# Patient Record
Sex: MALE | Race: WHITE | NOT HISPANIC OR LATINO | URBAN - NONMETROPOLITAN AREA
[De-identification: names, ages, dates, MRNs, and addresses within clinical notes are randomized per-mention and may not be internally consistent; named-entity substitution may affect disease eponyms.]

---

## 2022-01-20 PROCEDURE — U0004 COV-19 TEST NON-CDC HGH THRU: HCPCS | Performed by: PHYSICIAN ASSISTANT

## 2024-08-05 ENCOUNTER — TELEPHONE (OUTPATIENT)
Dept: INTERNAL MEDICINE | Facility: CLINIC | Age: 34
End: 2024-08-05

## 2024-08-05 ENCOUNTER — TELEMEDICINE (OUTPATIENT)
Dept: INTERNAL MEDICINE | Facility: CLINIC | Age: 34
End: 2024-08-05
Payer: COMMERCIAL

## 2024-08-05 VITALS — WEIGHT: 300 LBS | BODY MASS INDEX: 37.3 KG/M2 | HEIGHT: 75 IN

## 2024-08-05 DIAGNOSIS — E66.09 CLASS 2 OBESITY DUE TO EXCESS CALORIES WITHOUT SERIOUS COMORBIDITY WITH BODY MASS INDEX (BMI) OF 37.0 TO 37.9 IN ADULT: ICD-10-CM

## 2024-08-05 DIAGNOSIS — Z11.59 ENCOUNTER FOR HEPATITIS C SCREENING TEST FOR LOW RISK PATIENT: ICD-10-CM

## 2024-08-05 DIAGNOSIS — R73.03 PREDIABETES: Primary | ICD-10-CM

## 2024-08-05 DIAGNOSIS — R68.89 EPISODES OF DECREASED ATTENTIVENESS: ICD-10-CM

## 2024-08-05 PROBLEM — E66.812 CLASS 2 OBESITY DUE TO EXCESS CALORIES WITHOUT SERIOUS COMORBIDITY WITH BODY MASS INDEX (BMI) OF 37.0 TO 37.9 IN ADULT: Status: ACTIVE | Noted: 2024-08-05

## 2024-08-05 PROCEDURE — 99385 PREV VISIT NEW AGE 18-39: CPT | Performed by: STUDENT IN AN ORGANIZED HEALTH CARE EDUCATION/TRAINING PROGRAM

## 2024-08-05 PROCEDURE — 99213 OFFICE O/P EST LOW 20 MIN: CPT | Performed by: STUDENT IN AN ORGANIZED HEALTH CARE EDUCATION/TRAINING PROGRAM

## 2024-08-05 RX ORDER — PHENTERMINE HYDROCHLORIDE 37.5 MG/1
37.5 TABLET ORAL
COMMUNITY
Start: 2024-07-18

## 2024-08-05 RX ORDER — CHOLECALCIFEROL (VITAMIN D3) 25 MCG
1000 TABLET ORAL DAILY
COMMUNITY

## 2024-08-05 RX ORDER — ASCORBIC ACID 1000 MG
125 TABLET ORAL DAILY
COMMUNITY

## 2024-08-05 RX ORDER — MAGNESIUM GLYCINATE 100 MG
2 CAPSULE ORAL DAILY
COMMUNITY

## 2024-08-05 RX ORDER — LANOLIN ALCOHOL/MO/W.PET/CERES
5000 CREAM (GRAM) TOPICAL DAILY
COMMUNITY

## 2024-08-05 RX ORDER — CHLORAL HYDRATE 500 MG
1000 CAPSULE ORAL
COMMUNITY

## 2024-08-05 RX ORDER — LANOLIN ALCOHOL/MO/W.PET/CERES
400 CREAM (GRAM) TOPICAL DAILY
COMMUNITY

## 2024-08-05 RX ORDER — AMOXICILLIN AND CLAVULANATE POTASSIUM 875; 125 MG/1; MG/1
1 TABLET, FILM COATED ORAL EVERY 12 HOURS SCHEDULED
COMMUNITY
Start: 2024-07-23

## 2024-08-05 NOTE — ASSESSMENT & PLAN NOTE
Patient's (Body mass index is 37.5 kg/m².) indicates that they are morbidly/severely obese (BMI > 40 or > 35 with obesity - related health condition) with health conditions that include none . Weight is unchanged. BMI  is above average; BMI management plan is completed. We discussed portion control and increasing exercise.   Continue to follow with weight loss clinic as pt chooses to continue phentermine

## 2024-08-05 NOTE — TELEPHONE ENCOUNTER
"Relay     \"LVM to call our office back. [facility] or reschedule for another day due to Dr. Wayne being out of office today  \"                  "

## 2024-08-05 NOTE — PROGRESS NOTES
"Chief Complaint  Establish Care (Previous PCP (Never had one))  Difficulty concentrating, annual exam    This was an audio and video enabled telemedicine encounter.  Patient location Home provider location Home office  Subjective      Omid Watts presents to [facility]  History of Present Illness  Vitamin d def: on vitamin D  On B12 daily and fish oil  BP at home usually around 120/70 at home  Obese follows with weight loss clinic, on phentermine, discussed side effects and discussed other options but patient Not interested in GLP 1 injections    He also complains of difficulty in focusing and concentration     Objective   Vital Signs:   Ht 190.5 cm (75\")   Wt 136 kg (300 lb)   BMI 37.50 kg/m²     Body mass index is 37.5 kg/m².            Current Outpatient Medications:     amoxicillin-clavulanate (AUGMENTIN) 875-125 MG per tablet, Take 1 tablet by mouth Every 12 (Twelve) Hours., Disp: , Rfl:     ASHWAGANDHA GUMMIES PO, Take 600 mg by mouth Daily., Disp: , Rfl:     Cholecalciferol 25 MCG (1000 UT) tablet, Take 1 tablet by mouth Daily., Disp: , Rfl:     folic acid (FOLVITE) 400 MCG tablet, Take 1 tablet by mouth Daily., Disp: , Rfl:     Ginkgo Biloba (Ginkoba) 40 MG tablet, Take 125 mg by mouth Daily., Disp: , Rfl:     Green Tea, Alana sinensis, (GREEN TEA EXTRACT PO), Take 350 mg by mouth Daily., Disp: , Rfl:     Magnesium Glycinate 100 MG capsule, Take 2 tablets by mouth Daily., Disp: , Rfl:     Multiple Vitamins-Minerals (ONE-A-DAY FOR HIM VITACRAVES PO), Take 1 tablet by mouth Daily., Disp: , Rfl:     Omega-3 Fatty Acids (fish oil) 1000 MG capsule capsule, Take 1 capsule by mouth Daily With Breakfast., Disp: , Rfl:     phentermine (ADIPEX-P) 37.5 MG tablet, Take 1 tablet by mouth Every Morning Before Breakfast., Disp: , Rfl:     vitamin B-12 (CYANOCOBALAMIN) 1000 MCG tablet, Take 5 tablets by mouth Daily., Disp: , Rfl:       Allergies: Patient has no known " allergies.    Physical Exam  HENT:      Head: Normocephalic.   Neurological:      General: No focal deficit present.      Mental Status: He is alert. Mental status is at baseline.          Result Review :                  Assessment and Plan   Diagnoses and all orders for this visit:    1. Prediabetes (Primary)  Assessment & Plan:  Have labs done  Not on any medications at this time    Orders:  -     Cancel: CBC & Differential  -     Cancel: Comprehensive Metabolic Panel  -     Cancel: Lipid Panel  -     Cancel: TSH Rfx On Abnormal To Free T4  -     Cancel: Vitamin B12  -     Cancel: Vitamin D,25-Hydroxy  -     Cancel: Hemoglobin A1c  -     CBC & Differential; Future  -     Comprehensive Metabolic Panel; Future  -     Hemoglobin A1c; Future  -     Lipid Panel; Future  -     TSH Rfx On Abnormal To Free T4; Future  -     Vitamin B12; Future  -     Vitamin D,25-Hydroxy; Future    2. Class 2 obesity due to excess calories without serious comorbidity with body mass index (BMI) of 37.0 to 37.9 in adult  Assessment & Plan:  Patient's (Body mass index is 37.5 kg/m².) indicates that they are morbidly/severely obese (BMI > 40 or > 35 with obesity - related health condition) with health conditions that include none . Weight is unchanged. BMI  is above average; BMI management plan is completed. We discussed portion control and increasing exercise.   Continue to follow with weight loss clinic as pt chooses to continue phentermine    Orders:  -     Cancel: CBC & Differential  -     Cancel: Comprehensive Metabolic Panel  -     Cancel: Lipid Panel  -     Cancel: TSH Rfx On Abnormal To Free T4  -     Cancel: Vitamin B12  -     Cancel: Vitamin D,25-Hydroxy  -     Cancel: Hemoglobin A1c  -     CBC & Differential; Future  -     Comprehensive Metabolic Panel; Future  -     Hemoglobin A1c; Future  -     Lipid Panel; Future  -     TSH Rfx On Abnormal To Free T4; Future  -     Vitamin B12; Future  -     Vitamin D,25-Hydroxy; Future    3.  Encounter for hepatitis C screening test for low risk patient  -     Cancel: Hepatitis C RNA, Quantitative, PCR (graph)  -     Hepatitis C RNA, Quantitative, PCR (graph); Future    4. Episodes of decreased attentiveness  Assessment & Plan:  Refer to behavioral health    Orders:  -     Ambulatory Referral to Behavioral Health              Class 2 Severe Obesity (BMI >=35 and <=39.9). Obesity-related health conditions include the following: none. Obesity is unchanged. BMI is is above average; BMI management plan is completed. We discussed portion control and increasing exercise.       Follow Up   Return in about 6 months (around 2/5/2025).  Patient was given instructions and counseling regarding his condition or for health maintenance advice. Please see specific information pulled into the AVS if appropriate.     Brenda Wayne MD

## 2024-08-29 ENCOUNTER — OFFICE VISIT (OUTPATIENT)
Dept: INTERNAL MEDICINE | Facility: CLINIC | Age: 34
End: 2024-08-29
Payer: COMMERCIAL

## 2024-08-29 VITALS
RESPIRATION RATE: 16 BRPM | BODY MASS INDEX: 37.67 KG/M2 | HEIGHT: 75 IN | WEIGHT: 303 LBS | DIASTOLIC BLOOD PRESSURE: 96 MMHG | SYSTOLIC BLOOD PRESSURE: 122 MMHG | HEART RATE: 91 BPM | OXYGEN SATURATION: 97 %

## 2024-08-29 DIAGNOSIS — E66.09 CLASS 2 OBESITY DUE TO EXCESS CALORIES WITHOUT SERIOUS COMORBIDITY WITH BODY MASS INDEX (BMI) OF 37.0 TO 37.9 IN ADULT: Primary | ICD-10-CM

## 2024-08-29 DIAGNOSIS — R73.03 PREDIABETES: ICD-10-CM

## 2024-08-29 DIAGNOSIS — Z23 NEED FOR TDAP VACCINATION: ICD-10-CM

## 2024-08-29 DIAGNOSIS — Z11.59 ENCOUNTER FOR HEPATITIS C SCREENING TEST FOR LOW RISK PATIENT: ICD-10-CM

## 2024-08-29 DIAGNOSIS — T78.40XS ALLERGY, SEQUELA: ICD-10-CM

## 2024-08-29 PROBLEM — T78.40XA ALLERGY: Status: ACTIVE | Noted: 2024-08-29

## 2024-08-29 PROCEDURE — 90471 IMMUNIZATION ADMIN: CPT | Performed by: STUDENT IN AN ORGANIZED HEALTH CARE EDUCATION/TRAINING PROGRAM

## 2024-08-29 PROCEDURE — 99214 OFFICE O/P EST MOD 30 MIN: CPT | Performed by: STUDENT IN AN ORGANIZED HEALTH CARE EDUCATION/TRAINING PROGRAM

## 2024-08-29 PROCEDURE — 90715 TDAP VACCINE 7 YRS/> IM: CPT | Performed by: STUDENT IN AN ORGANIZED HEALTH CARE EDUCATION/TRAINING PROGRAM

## 2024-08-29 RX ORDER — FLUTICASONE PROPIONATE 50 MCG
2 SPRAY, SUSPENSION (ML) NASAL DAILY
Qty: 15.8 ML | Refills: 2 | Status: SHIPPED | OUTPATIENT
Start: 2024-08-29

## 2024-08-29 RX ORDER — LORATADINE 10 MG/1
10 TABLET ORAL DAILY
Qty: 30 TABLET | Refills: 2 | Status: SHIPPED | OUTPATIENT
Start: 2024-08-29

## 2024-08-29 NOTE — PROGRESS NOTES
Office Note     Name: Omid Watts    : 1990     MRN: [id number]     Chief Complaint  Immunizations (Needs Tdap), Labs Only (Needs labs), and Annual Exam    Subjective     History of Present Illness:  Omid Watts is a 33 y.o. male who presents today for chronic conditions.    Vitamin d def: on vitamin D  On B12 daily and fish oil  BP at home usually around 120/70 at home  Obese follows with weight loss clinic, on phentermine, discussed side effects and discussed other options but patient Not interested in GLP 1 injections    Ear fullness, only on left, chronic. When raising his voice he notes echos.      Family History:   Family History   Problem Relation Age of Onset    Alcohol abuse Mother         She’s dead    Depression Mother     Early death Mother     Hearing loss Mother     Depression Father     Diabetes Father     Early death Father     Liver disease Father     Depression Sister     Drug abuse Sister        Social History:   Social History     Socioeconomic History    Marital status:    Tobacco Use    Smoking status: Every Day     Current packs/day: 0.50     Average packs/day: 0.5 packs/day for 1.1 years (0.6 ttl pk-yrs)     Types: Cigarettes     Start date: 2024    Smokeless tobacco: Current     Types: Chew    Tobacco comments:     I've been dipping since I was 9. I been off and on smoking because it's the only time I get to see   Vaping Use    Vaping status: Never Used   Substance and Sexual Activity    Alcohol use: Never    Drug use: Never    Sexual activity: Not Currently     Partners: Female       Health Maintenance   Topic Date Due    Pneumococcal Vaccine 0-64 (1 of 2 - PCV) Never done    HEPATITIS C SCREENING  Never done    INFLUENZA VACCINE  2024    COVID-19 Vaccine (3 - - season) 2025 (Originally 2023)    ANNUAL PHYSICAL  2025    BMI FOLLOWUP  2025    TDAP/TD VACCINES (2 - Td or Tdap) 2034       Objective     Vital Signs  /96    "Pulse 91   Resp 16   Ht 190.5 cm (75\")   Wt (!) 137 kg (303 lb)   SpO2 97%   BMI 37.87 kg/m²   Estimated body mass index is 37.87 kg/m² as calculated from the following:    Height as of this encounter: 190.5 cm (75\").    Weight as of this encounter: 137 kg (303 lb).        Physical Exam  Vitals and nursing note reviewed.   Constitutional:       Appearance: Normal appearance.   HENT:      Head: Normocephalic and atraumatic.   Cardiovascular:      Rate and Rhythm: Normal rate and regular rhythm.      Pulses: Normal pulses.      Heart sounds: Normal heart sounds.   Pulmonary:      Effort: Pulmonary effort is normal. No respiratory distress.      Breath sounds: Normal breath sounds. No wheezing, rhonchi or rales.   Abdominal:      General: Abdomen is flat. Bowel sounds are normal.      Palpations: Abdomen is soft.      Tenderness: There is no abdominal tenderness. There is no guarding.   Musculoskeletal:      Cervical back: Neck supple.   Skin:     General: Skin is warm.      Capillary Refill: Capillary refill takes less than 2 seconds.   Neurological:      General: No focal deficit present.      Mental Status: He is alert. Mental status is at baseline.   Psychiatric:         Mood and Affect: Mood normal.         Behavior: Behavior normal.          Procedures     Assessment and Plan     Diagnoses and all orders for this visit:    1. Class 2 obesity due to excess calories without serious comorbidity with body mass index (BMI) of 37.0 to 37.9 in adult (Primary)  Assessment & Plan:  Patient's (Body mass index is 37.87 kg/m².) indicates that they are morbidly/severely obese (BMI > 40 or > 35 with obesity - related health condition) with health conditions that include none . Weight is unchanged. BMI  is above average; BMI management plan is completed. We discussed portion control and increasing exercise.   Continue to follow with weight loss clinic as pt chooses to continue phentermine    Orders:  -     CBC & " Differential  -     Comprehensive Metabolic Panel  -     Hemoglobin A1c  -     Lipid Panel  -     TSH Rfx On Abnormal To Free T4  -     Vitamin B12  -     Vitamin D,25-Hydroxy    2. Prediabetes  Assessment & Plan:  Have labs done  Not on any medications at this time    Orders:  -     CBC & Differential  -     Comprehensive Metabolic Panel  -     Hemoglobin A1c  -     Lipid Panel  -     TSH Rfx On Abnormal To Free T4  -     Vitamin B12  -     Vitamin D,25-Hydroxy    3. Need for Tdap vaccination  -     Tdap Vaccine Greater Than or Equal To 6yo IM    4. Encounter for hepatitis C screening test for low risk patient  -     Hepatitis C Antibody    5. Allergy, sequela  -     fluticasone (FLONASE) 50 MCG/ACT nasal spray; 2 sprays into the nostril(s) as directed by provider Daily.  Dispense: 15.8 mL; Refill: 2  -     loratadine (Claritin) 10 MG tablet; Take 1 tablet by mouth Daily.  Dispense: 30 tablet; Refill: 2         Counseling was given to patient for the following topics: instructions for management, risks and benefits of treatment options, and importance of treatment compliance.    Follow Up  Return in about 6 months (around 2/28/2025) for Next scheduled follow up.    MD VERENA Lombardi [facility]  [address] [place] [id number] [phone]

## 2024-08-29 NOTE — ASSESSMENT & PLAN NOTE
Patient's (Body mass index is 37.87 kg/m².) indicates that they are morbidly/severely obese (BMI > 40 or > 35 with obesity - related health condition) with health conditions that include none . Weight is unchanged. BMI  is above average; BMI management plan is completed. We discussed portion control and increasing exercise.   Continue to follow with weight loss clinic as pt chooses to continue phentermine

## 2024-08-31 LAB
25(OH)D3+25(OH)D2 SERPL-MCNC: 38.6 NG/ML (ref 30–100)
ALBUMIN SERPL-MCNC: 4.4 G/DL (ref 3.5–5.2)
ALBUMIN/GLOB SERPL: 1.8 G/DL
ALP SERPL-CCNC: 76 U/L (ref 39–117)
ALT SERPL-CCNC: 25 U/L (ref 1–41)
AST SERPL-CCNC: 25 U/L (ref 1–40)
BASOPHILS # BLD AUTO: 0.03 10*3/MM3 (ref 0–0.2)
BASOPHILS NFR BLD AUTO: 0.4 % (ref 0–1.5)
BILIRUB SERPL-MCNC: 0.5 MG/DL (ref 0–1.2)
BUN SERPL-MCNC: 12 MG/DL (ref 6–20)
BUN/CREAT SERPL: 10.3 (ref 7–25)
CALCIUM SERPL-MCNC: 9.9 MG/DL (ref 8.6–10.5)
CHLORIDE SERPL-SCNC: 103 MMOL/L (ref 98–107)
CHOLEST SERPL-MCNC: 160 MG/DL (ref 0–200)
CO2 SERPL-SCNC: 26 MMOL/L (ref 22–29)
CREAT SERPL-MCNC: 1.16 MG/DL (ref 0.76–1.27)
EGFRCR SERPLBLD CKD-EPI 2021: 84.2 ML/MIN/1.73
EOSINOPHIL # BLD AUTO: 0.4 10*3/MM3 (ref 0–0.4)
EOSINOPHIL NFR BLD AUTO: 4.8 % (ref 0.3–6.2)
ERYTHROCYTE [DISTWIDTH] IN BLOOD BY AUTOMATED COUNT: 13 % (ref 12.3–15.4)
GLOBULIN SER CALC-MCNC: 2.5 GM/DL
GLUCOSE SERPL-MCNC: 104 MG/DL (ref 65–99)
HBA1C MFR BLD: 5.4 % (ref 4.8–5.6)
HCT VFR BLD AUTO: 45.3 % (ref 37.5–51)
HCV IGG SERPL QL IA: NON REACTIVE
HDLC SERPL-MCNC: 34 MG/DL (ref 40–60)
HGB BLD-MCNC: 15.3 G/DL (ref 13–17.7)
IMM GRANULOCYTES # BLD AUTO: 0.04 10*3/MM3 (ref 0–0.05)
IMM GRANULOCYTES NFR BLD AUTO: 0.5 % (ref 0–0.5)
LDLC SERPL CALC-MCNC: 110 MG/DL (ref 0–100)
LYMPHOCYTES # BLD AUTO: 2.65 10*3/MM3 (ref 0.7–3.1)
LYMPHOCYTES NFR BLD AUTO: 31.6 % (ref 19.6–45.3)
MCH RBC QN AUTO: 28.8 PG (ref 26.6–33)
MCHC RBC AUTO-ENTMCNC: 33.8 G/DL (ref 31.5–35.7)
MCV RBC AUTO: 85.3 FL (ref 79–97)
MONOCYTES # BLD AUTO: 0.57 10*3/MM3 (ref 0.1–0.9)
MONOCYTES NFR BLD AUTO: 6.8 % (ref 5–12)
NEUTROPHILS # BLD AUTO: 4.7 10*3/MM3 (ref 1.7–7)
NEUTROPHILS NFR BLD AUTO: 55.9 % (ref 42.7–76)
NRBC BLD AUTO-RTO: 0 /100 WBC (ref 0–0.2)
PLATELET # BLD AUTO: 296 10*3/MM3 (ref 140–450)
POTASSIUM SERPL-SCNC: 4.5 MMOL/L (ref 3.5–5.2)
PROT SERPL-MCNC: 6.9 G/DL (ref 6–8.5)
RBC # BLD AUTO: 5.31 10*6/MM3 (ref 4.14–5.8)
SODIUM SERPL-SCNC: 141 MMOL/L (ref 136–145)
TRIGL SERPL-MCNC: 87 MG/DL (ref 0–150)
TSH SERPL DL<=0.005 MIU/L-ACNC: 1.31 UIU/ML (ref 0.27–4.2)
VIT B12 SERPL-MCNC: 1600 PG/ML (ref 211–946)
VLDLC SERPL CALC-MCNC: 16 MG/DL (ref 5–40)
WBC # BLD AUTO: 8.39 10*3/MM3 (ref 3.4–10.8)

## 2024-09-03 DIAGNOSIS — R74.8 ELEVATED VITAMIN B12 LEVEL: ICD-10-CM

## 2024-09-03 DIAGNOSIS — R73.03 PREDIABETES: Primary | ICD-10-CM

## 2024-09-03 DIAGNOSIS — E66.09 CLASS 2 OBESITY DUE TO EXCESS CALORIES WITHOUT SERIOUS COMORBIDITY WITH BODY MASS INDEX (BMI) OF 37.0 TO 37.9 IN ADULT: ICD-10-CM

## 2024-10-10 ENCOUNTER — OFFICE VISIT (OUTPATIENT)
Dept: BEHAVIORAL HEALTH | Facility: CLINIC | Age: 34
End: 2024-10-10
Payer: COMMERCIAL

## 2024-10-10 VITALS
WEIGHT: 308 LBS | SYSTOLIC BLOOD PRESSURE: 124 MMHG | DIASTOLIC BLOOD PRESSURE: 86 MMHG | HEIGHT: 75 IN | BODY MASS INDEX: 38.3 KG/M2

## 2024-10-10 DIAGNOSIS — F90.0 ATTENTION DEFICIT HYPERACTIVITY DISORDER (ADHD), PREDOMINANTLY INATTENTIVE TYPE: Primary | ICD-10-CM

## 2024-10-10 RX ORDER — DEXTROAMPHETAMINE SACCHARATE, AMPHETAMINE ASPARTATE, DEXTROAMPHETAMINE SULFATE AND AMPHETAMINE SULFATE 2.5; 2.5; 2.5; 2.5 MG/1; MG/1; MG/1; MG/1
10 TABLET ORAL 2 TIMES DAILY
Qty: 60 TABLET | Refills: 0 | Status: SHIPPED | OUTPATIENT
Start: 2024-10-10

## 2024-10-10 NOTE — PROGRESS NOTES
"    New Patient Office Visit      Date: 10/10/2024  Patient Name: Omid Watts  : 1990   MRN: [id number]     Referring Provider: Brenda Wayne MD    Chief Complaint:      ICD-10-CM ICD-9-CM   1. Attention deficit hyperactivity disorder (ADHD), predominantly inattentive type  F90.0 314.00        History of Present Illness:   Omid Watts is a 34 y.o. male who is here today to establish care.  He reports that he has never been diagnosed with any psychiatric condition, but noticed last year, when he started phentermine for weight loss, he had improved focus and impulse control.  He has had increased stressors recently, including notification that his contract would not be renewed in January, and divorce being finalized this month.  He reports minimal social support, and has \"no stress outlet with everything going on.\"  As such, he has noticed increased irritability and feelings of anger.  His current work schedule alternates between multiple days on day shift, and multiple days on night shift; he hates it.  Though it does not help with feelings of anger or anxiety, he reports that having increased focus while on phentermine made him feel more calm, and he is interested in starting on ADHD medications, if indicated.  No SI/HI/psychotic/manic symptoms present, no adverse effects or side effects to current medications noted, and no issues with appetite or sleep reported.     Subjective      Review of Systems:   Review of Systems   Psychiatric/Behavioral:  Positive for decreased concentration, depressed mood and stress.        Screening Scores:   PHQ-9 : 9  LION-7 : 4  PTSD: 41  MDQ: 9  ASRS-V1.1: positive, inattentive    Past Psychiatric History:  History of outpatient psychiatrist: no  History of outpatient therapy: couples counseling  Previous Inpatient hospitalizations: no  Previous diagnoses: no  Previous medication trials: phentermine  History of suicide attempts: no  History of self harming behaviors: " "no     Abuse/trauma History:              Physical: \"nothing out of the ordinary\" \"[place] ordinary\"              Sexual: no              Emotional/Neglect: \"nothing out of the ordinary\"              Death/loss of relationship: hasn't talked to his sister in 20 years              Other trauma: active service in the army for 6 years, one deployment to [facility]                Substance Abuse History:              Alcohol: no              Tobacco/Vape: \"tremendous amount of nicotine;\" started dipping daily around age 9, smokes multiple times a day              Illicit Drugs: no  Marijuana/THC: no  Hallucinogens: no                Legal History:  The patient has no significant history of legal issues.     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  Leisure and recreation: hard to answer; used to enjoy motorcycles, skydiving, boating  Support system: \"they're all dead\"  Sikhism practices: Mosque     Family History:  Family History   Problem Relation Age of Onset    Alcohol abuse Mother         She’s dead    Depression Mother     Early death Mother     Hearing loss Mother     Depression Father     Diabetes Father     Early death Father     Liver disease Father     Depression Sister     Drug abuse Sister        Family Psychiatric History:   Psych diagnoses: no  Suicide/self harm attempts: no  Substance abuse: sister abuses drugs, mother is an alcoholic    Patient Medical History:  Are there any significant health issues (current or past): restless legs  History of seizures: no   History of head injuries: no  History of cardiac issues: no  Herbal medications / dietary supplements: daily mulitvitamin    Past Medical History:   Diagnosis " Date    ADHD (attention deficit hyperactivity disorder)     I’ve never been tested, but I now I have my suspicions.    Allergic     Arthritis     My hands easily cramp up    HL (hearing loss)     I have mild hearing loss.    Obesity     I’m fat, but I’m working on it.    Visual impairment     I need glasses or contacts       Past Surgical History:   History reviewed. No pertinent surgical history.    Medications:     Current Outpatient Medications:     ASHWAGANDHA GUMMIES PO, Take 600 mg by mouth Daily., Disp: , Rfl:     Cholecalciferol 25 MCG (1000 UT) tablet, Take 1 tablet by mouth Daily., Disp: , Rfl:     fluticasone (FLONASE) 50 MCG/ACT nasal spray, 2 sprays into the nostril(s) as directed by provider Daily., Disp: 15.8 mL, Rfl: 2    folic acid (FOLVITE) 400 MCG tablet, Take 1 tablet by mouth Daily., Disp: , Rfl:     Ginkgo Biloba (Ginkoba) 40 MG tablet, Take 125 mg by mouth Daily., Disp: , Rfl:     Green Tea, Alana sinensis, (GREEN TEA EXTRACT PO), Take 350 mg by mouth Daily., Disp: , Rfl:     Magnesium Glycinate 100 MG capsule, Take 2 tablets by mouth Daily., Disp: , Rfl:     Multiple Vitamins-Minerals (ONE-A-DAY FOR HIM VITACRAVES PO), Take 1 tablet by mouth Daily., Disp: , Rfl:     Omega-3 Fatty Acids (fish oil) 1000 MG capsule capsule, Take 1 capsule by mouth Daily With Breakfast., Disp: , Rfl:     amphetamine-dextroamphetamine (Adderall) 10 MG tablet, Take 1 tablet by mouth 2 (Two) Times a Day., Disp: 60 tablet, Rfl: 0    loratadine (Claritin) 10 MG tablet, Take 1 tablet by mouth Daily., Disp: 30 tablet, Rfl: 2    phentermine (ADIPEX-P) 37.5 MG tablet, Take 1 tablet by mouth Every Morning Before Breakfast. (Patient not taking: Reported on 10/10/2024), Disp: , Rfl:     vitamin B-12 (CYANOCOBALAMIN) 1000 MCG tablet, Take 5 tablets by mouth Daily. (Patient not taking: Reported on 10/10/2024), Disp: , Rfl:     Medication Considerations:  SUE reviewed and appropriate.      Allergies:   No Known  "Allergies    Objective   Vital Signs:   Vitals:    10/10/24 0952   BP: 124/86   Weight: (!) 140 kg (308 lb)   Height: 190.5 cm (75\")     Body mass index is 38.5 kg/m².     Mental Status Exam:   MENTAL STATUS EXAM   General Appearance:  Cleanly groomed and dressed  Eye Contact:  Good eye contact  Attitude:  Cooperative and guarded  Motor Activity:  Normal gait, posture  Muscle Strength:  Normal  Speech:  Normal rate, tone, volume  Language:  Spontaneous  Mood and affect:  Normal, pleasant  Hopelessness:  Denies  Loneliness: Denies  Thought Process:  Logical  Associations/ Thought Content:  No delusions  Hallucinations:  None  Suicidal Ideations:  Not present  Homicidal Ideation:  Not present  Sensorium:  Alert and clear  Orientation:  Person, place, time and situation  Immediate Recall, Recent, and Remote Memory:  Intact  Attention Span/ Concentration:  Easily distracted  Fund of Knowledge:  Appropriate for age and educational level  Intellectual Functioning:  Average range  Insight:  Good  Judgement:  Good  Reliability:  Good  Impulse Control:  Good       SUICIDE RISK ASSESSMENT/CSSRS:  1. Does patient have thoughts of suicide? no  2. Does patient have intent for suicide? no  3. Does patient have a current plan for suicide? no  4. History of suicide attempts: no  5. Family history of suicide or attempts: no  6. History of violent behaviors towards others or property or thoughts of committing suicide: no  7. History of sexual aggression toward others: no  8. Access to firearms or weapons: no    Labs Reviewed: n/a  UDS Reviewed: n/a  Chart Reviewed: yes    Assessment / Plan      Quality Measures:   Tobacco cessation: Omid Watts  reports that he has been smoking cigarettes. He started smoking about 7 months ago. He has a 0.6 pack-year smoking history. His smokeless tobacco use includes chew. I have educated him on the risk of diseases from using tobacco products such as cancer, COPD, and heart disease. I advised him " "to quit and he is not willing to quit. I spent 3  minutes counseling the patient.     Depression (PHQ >9): Patient screened positive for depression with a PHQ score of 9. Follow up recommendations include medication management, suicide risk assessment, continued screening score monitoring and supportive care.    Medication Considerations:  Benzo: n/a  Stimulants: CSA up to date and on file   SUE reviewed and appropriate.     Safety: No acute safety concerns    Risk Assessment: Risk of self-harm acutely is low. Risk of self-harm chronically is also low, but could be further elevated in the event of treatment noncompliance and/or AODA.    Visit Diagnosis/Orders Placed This Visit:  Diagnoses and all orders for this visit:    1. Attention deficit hyperactivity disorder (ADHD), predominantly inattentive type (Primary)  -     amphetamine-dextroamphetamine (Adderall) 10 MG tablet; Take 1 tablet by mouth 2 (Two) Times a Day.  Dispense: 60 tablet; Refill: 0         Impression/Formulation:  Patient appeared alert and oriented.  Patient is voluntarily seeking psychiatric care at [facility].  Patient is receptive to assistance with maintaining a stable lifestyle.  Patient presents with history of     ICD-10-CM ICD-9-CM   1. Attention deficit hyperactivity disorder (ADHD), predominantly inattentive type  F90.0 314.00     Patient came from a family where emotional expression was discouraged, and mental struggles were not discussed.  He started smoking and chewing tobacco as an adolescent, and currently admits to consuming \"an excessive amount.\"  It is possible that he was self-medicating ADHD symptoms for years with nicotine as a stimulant, but discovered the difference in how he felt when he started a prescribed stimulant for weight loss.  Given the stressors in his life currently, and upcoming major life transition, he is open to engaging in mental health care for the first time.  He would likely " benefit from some kind of therapy eventually, but has a difficult time reaching out to other people for help; he seems open to learning on an individual basis for now, in addition to medication management.    Treatment Plan:   Start Adderall 10 mg twice daily for ADHD management.  Advised patient to reduce nicotine intake, especially during the first week on the new stimulant. Discussed book recommendations, establishing care with a local therapist, and nonpharmacologic interventions for anxiety. Follow up in 4 weeks.    Any medications prescribed have been sent electronically to [facility] in [place].     Patient will continue supportive psychotherapy efforts and medications as indicated. Discussed medication options and treatment plan of prescribed medication(s) as well as the risks, benefits, and potential side effects. Patient ackowledged and verbally consented to continue with current treatment plan and was educated on the importance of compliance with treatment and follow-up appointments. Patient seems reasonably able to adhere to treatment plan.      Assisted Patient in identifying risk factors which would indicate the need for higher level of care including thoughts to harm self or others and/or self-harming behavior and encouraged Patient to contact this office, call 911, or present to the nearest emergency room should any of these events occur. Discussed crisis intervention services and means to access. Clinic will obtain release of information for current treatment team for continuity of care as needed. Patient adamantly and convincingly denies current suicidal or homicidal ideation or perceptual disturbance.     Follow Up:   Return in about 4 weeks (around 11/7/2024) for Medication Management.        GISELLE Daley  [facility]    This is electronically signed by GISELLE Daley  10/10/2024 10:16 EDT

## 2024-10-10 NOTE — PATIENT INSTRUCTIONS
[email or web address]: online therapist directory    Lisbeth recommendations:  Aminata and Hoopla: free library access, including audiobooks and e-books  I Am: affirmations  Balance: mindfulness and meditation  Santos: mental health lisbeth for kids and adults  Bilateral stimulation music: free on youtube and spotify    Book Recommendations:  How To Do The Work, Michelle Shook (follow the Holistic Psychologist)  Unf**k Your Brain, Audelia Pereira  ADHD 2.0, by Sari    Parenting a Child Who Has Intense Emotions, Fabricio

## 2024-11-12 ENCOUNTER — OFFICE VISIT (OUTPATIENT)
Dept: BEHAVIORAL HEALTH | Facility: CLINIC | Age: 34
End: 2024-11-12
Payer: COMMERCIAL

## 2024-11-12 VITALS
WEIGHT: 314.1 LBS | SYSTOLIC BLOOD PRESSURE: 130 MMHG | DIASTOLIC BLOOD PRESSURE: 88 MMHG | HEIGHT: 75 IN | BODY MASS INDEX: 39.05 KG/M2

## 2024-11-12 DIAGNOSIS — F90.0 ATTENTION DEFICIT HYPERACTIVITY DISORDER (ADHD), PREDOMINANTLY INATTENTIVE TYPE: Primary | ICD-10-CM

## 2024-11-12 RX ORDER — VILOXAZINE HYDROCHLORIDE 200 MG/1
200 CAPSULE, EXTENDED RELEASE ORAL DAILY
Qty: 7 CAPSULE | Refills: 0 | COMMUNITY
Start: 2024-11-12 | End: 2024-11-19

## 2024-11-12 RX ORDER — VILOXAZINE HYDROCHLORIDE 200 MG/1
400 CAPSULE, EXTENDED RELEASE ORAL DAILY
Qty: 60 CAPSULE | Refills: 1 | Status: SHIPPED | OUTPATIENT
Start: 2024-11-12

## 2024-11-12 NOTE — PROGRESS NOTES
"              Follow Up Office Visit      Date: 2024   Patient Name: Omid Watts  : 1990   MRN: [id number]     Referring Provider: Brenda Wayne MD    Chief Complaint:      ICD-10-CM ICD-9-CM   1. Attention deficit hyperactivity disorder (ADHD), predominantly inattentive type  F90.0 314.00        History of Present Illness:   Omid Watts is a 34 y.o. male who is here today for follow up with medication management for ADHD.  He reports that he \"did not notice a difference at all\" when he was taking the Adderall, but he is still interested in medication management for ADHD.  His life stressors continue, and he wants to be able to navigate them to the best of his ability.  No SI/HI/psychotic/manic symptoms present, no current medications noted, and no issues with appetite or sleep reported.     Subjective     Review of Systems:   Review of Systems   Psychiatric/Behavioral:  Positive for decreased concentration and stress.        Screening Scores:   Not completed this visit.    Medications:     Current Outpatient Medications:     amphetamine-dextroamphetamine (Adderall) 10 MG tablet, Take 1 tablet by mouth 2 (Two) Times a Day., Disp: 60 tablet, Rfl: 0    ASHWAGANDHA GUMMIES PO, Take 600 mg by mouth Daily., Disp: , Rfl:     Cholecalciferol 25 MCG (1000 UT) tablet, Take 1 tablet by mouth Daily., Disp: , Rfl:     fluticasone (FLONASE) 50 MCG/ACT nasal spray, 2 sprays into the nostril(s) as directed by provider Daily., Disp: 15.8 mL, Rfl: 2    folic acid (FOLVITE) 400 MCG tablet, Take 1 tablet by mouth Daily., Disp: , Rfl:     Ginkgo Biloba (Ginkoba) 40 MG tablet, Take 125 mg by mouth Daily., Disp: , Rfl:     Green Tea, Alana sinensis, (GREEN TEA EXTRACT PO), Take 350 mg by mouth Daily., Disp: , Rfl:     loratadine (Claritin) 10 MG tablet, Take 1 tablet by mouth Daily., Disp: 30 tablet, Rfl: 2    Magnesium Glycinate 100 MG capsule, Take 2 tablets by mouth Daily., Disp: , Rfl:     Multiple " "Vitamins-Minerals (ONE-A-DAY FOR HIM VITACRAVES PO), Take 1 tablet by mouth Daily., Disp: , Rfl:     Omega-3 Fatty Acids (fish oil) 1000 MG capsule capsule, Take 1 capsule by mouth Daily With Breakfast., Disp: , Rfl:     phentermine (ADIPEX-P) 37.5 MG tablet, Take 1 tablet by mouth Every Morning Before Breakfast. (Patient not taking: Reported on 11/12/2024), Disp: , Rfl:     Viloxazine HCl ER (Qelbree) 200 MG capsule sustained-release 24 hr, Take 1 capsule by mouth Daily for 7 days., Disp: 7 capsule, Rfl: 0    Viloxazine HCl ER (Qelbree) 200 MG capsule sustained-release 24 hr, Take 2 capsules by mouth Daily., Disp: 60 capsule, Rfl: 1    vitamin B-12 (CYANOCOBALAMIN) 1000 MCG tablet, Take 5 tablets by mouth Daily. (Patient not taking: Reported on 11/12/2024), Disp: , Rfl:     Allergies:   No Known Allergies    Results Reviewed: n/a     The following portion of the patient's history were reviewed and updated appropriately: allergies, current and past medications, family history, medical history and social history.    Objective     Vital Signs:   Vitals:    11/12/24 1522   BP: 130/88   Weight: (!) 142 kg (314 lb 1.6 oz)   Height: 190.5 cm (75\")     Body mass index is 39.26 kg/m².     Mental Status Exam:   MENTAL STATUS EXAM   General Appearance:  Cleanly groomed and dressed  Eye Contact:  Good eye contact  Attitude:  Cooperative  Motor Activity:  Normal gait, posture and fidgety  Muscle Strength:  Normal  Speech:  Normal rate, tone, volume  Language:  Spontaneous  Mood and affect:  Normal, pleasant and irritable  Hopelessness:  Denies  Loneliness: Denies  Thought Process:  Logical  Associations/ Thought Content:  No delusions  Hallucinations:  None  Suicidal Ideations:  Not present  Homicidal Ideation:  Not present  Sensorium:  Alert and clear  Orientation:  Person, place, time and situation  Immediate Recall, Recent, and Remote Memory:  Intact  Attention Span/ Concentration:  Easily distracted  Fund of Knowledge:  " Appropriate for age and educational level  Intellectual Functioning:  Average range  Insight:  Good  Judgement:  Good  Reliability:  Good  Impulse Control:  Good        SUICIDE RISK ASSESSMENT/CSSRS:  1. Does patient have thoughts of suicide? no  2. Does patient have intent for suicide? no  3. Does patient have a current plan for suicide? no  4. History of suicide attempts: no  5. Family history of suicide or attempts: no  6. History of violent behaviors towards others or property or thoughts of committing suicide: no  7. History of sexual aggression toward others: no  8. Access to firearms or weapons: no    Labs Reviewed: n/a  UDS Reviewed: n/a  Chart since last visit reviewed: yes    Assessment / Plan    Quality Measures:  Tobacco cessation: Omid Watts  reports that he has been smoking cigarettes. He started smoking about 9 months ago. He has a 0.7 pack-year smoking history. His smokeless tobacco use includes chew. I have educated him on the risk of diseases from using tobacco products such as cancer, COPD, and heart disease. I advised him to quit and he is not willing to quit. I spent 3  minutes counseling the patient.     Depression (PHQ >9): Patient screened negative this visit with a PHQ score < 9. Will continue to monitor screening scores and provide supportive care.    Medication Considerations:  Benzo: n/a  Stimulants: CSA up to date and on file   SUE reviewed and appropriate.     Risk Assessment: Risk of self-harm acutely is low. Risk of self-harm chronically is also low, but could be further elevated in the event of treatment noncompliance and/or AODA.    Visit Diagnosis/Orders Placed This Visit:  Diagnoses and all orders for this visit:    1. Attention deficit hyperactivity disorder (ADHD), predominantly inattentive type (Primary)  -     Viloxazine HCl ER (Qelbree) 200 MG capsule sustained-release 24 hr; Take 1 capsule by mouth Daily for 7 days.  Dispense: 7 capsule; Refill: 0  -     Viloxazine HCl  ER (Qelbree) 200 MG capsule sustained-release 24 hr; Take 2 capsules by mouth Daily.  Dispense: 60 capsule; Refill: 1         Impression/Formulation:  Patient appeared alert and oriented.  Patient is voluntarily continuing to receive psychiatric care at [facility].   Patient is receptive to assistance with maintaining a stable lifestyle.  Patient presents with history of     ICD-10-CM ICD-9-CM   1. Attention deficit hyperactivity disorder (ADHD), predominantly inattentive type  F90.0 314.00   .     Treatment Plan:   Discontinue Adderall.  Start Qelbree 200 mg daily for 1 week, and increase to 400 mg daily.  Samples given.  If insurance will not cover Qelbree, we can try Ritalin 10 mg twice daily.  Follow-up in 4 weeks.    Any medications prescribed have been sent electronically to [facility] in [place].     Patient will continue supportive psychotherapy efforts and medications as indicated.  Discussed medication options and treatment plan of prescribed medication(s) as well as the risks, benefits, and potential side effects. Patient will contact this office, call 911 or present to the nearest emergency room should suicidal or homicidal ideations occur. Clinic will obtain release of information for current treatment team for continuity of care as needed. Patient ackowledged and verbally consented to continue with current treatment plan and was educated on the importance of compliance with treatment and follow-up appointments.     Follow Up:   Return in about 4 weeks (around 12/10/2024) for Medication Management.        GISELLE Dyson  [facility]    This is electronically signed by GISELLE Dyson  11/12/2024 15:26 EST